# Patient Record
Sex: FEMALE | Race: WHITE | NOT HISPANIC OR LATINO | Employment: FULL TIME | URBAN - METROPOLITAN AREA
[De-identification: names, ages, dates, MRNs, and addresses within clinical notes are randomized per-mention and may not be internally consistent; named-entity substitution may affect disease eponyms.]

---

## 2023-08-09 LAB
ALANINE AMINOTRANSFERASE (SGPT) (U/L) IN SER/PLAS: 14 U/L (ref 7–45)
ALBUMIN (G/DL) IN SER/PLAS: 4.6 G/DL (ref 3.4–5)
ALKALINE PHOSPHATASE (U/L) IN SER/PLAS: 77 U/L (ref 33–110)
ANION GAP IN SER/PLAS: 12 MMOL/L (ref 10–20)
ASPARTATE AMINOTRANSFERASE (SGOT) (U/L) IN SER/PLAS: 19 U/L (ref 9–39)
BASOPHILS (10*3/UL) IN BLOOD BY AUTOMATED COUNT: 0.04 X10E9/L (ref 0–0.1)
BASOPHILS/100 LEUKOCYTES IN BLOOD BY AUTOMATED COUNT: 0.7 % (ref 0–2)
BILIRUBIN TOTAL (MG/DL) IN SER/PLAS: 0.7 MG/DL (ref 0–1.2)
C REACTIVE PROTEIN (MG/L) IN SER/PLAS: 0.31 MG/DL
CALCIUM (MG/DL) IN SER/PLAS: 10.3 MG/DL (ref 8.6–10.6)
CARBON DIOXIDE, TOTAL (MMOL/L) IN SER/PLAS: 32 MMOL/L (ref 21–32)
CHLORIDE (MMOL/L) IN SER/PLAS: 100 MMOL/L (ref 98–107)
CORTISOL (UG/DL) IN SERUM: 17.7 UG/DL (ref 2.5–20)
CREATININE (MG/DL) IN SER/PLAS: 0.77 MG/DL (ref 0.5–1.05)
EOSINOPHILS (10*3/UL) IN BLOOD BY AUTOMATED COUNT: 0.18 X10E9/L (ref 0–0.7)
EOSINOPHILS/100 LEUKOCYTES IN BLOOD BY AUTOMATED COUNT: 3.2 % (ref 0–6)
ERYTHROCYTE DISTRIBUTION WIDTH (RATIO) BY AUTOMATED COUNT: 12.7 % (ref 11.5–14.5)
ERYTHROCYTE MEAN CORPUSCULAR HEMOGLOBIN CONCENTRATION (G/DL) BY AUTOMATED: 32.6 G/DL (ref 32–36)
ERYTHROCYTE MEAN CORPUSCULAR VOLUME (FL) BY AUTOMATED COUNT: 98 FL (ref 80–100)
ERYTHROCYTES (10*6/UL) IN BLOOD BY AUTOMATED COUNT: 4.43 X10E12/L (ref 4–5.2)
FOLLITROPIN (IU/L) IN SER/PLAS: 37.4 IU/L
GFR FEMALE: >90 ML/MIN/1.73M2
GLUCOSE (MG/DL) IN SER/PLAS: 83 MG/DL (ref 74–99)
HEMATOCRIT (%) IN BLOOD BY AUTOMATED COUNT: 43.3 % (ref 36–46)
HEMOGLOBIN (G/DL) IN BLOOD: 14.1 G/DL (ref 12–16)
IMMATURE GRANULOCYTES/100 LEUKOCYTES IN BLOOD BY AUTOMATED COUNT: 0.2 % (ref 0–0.9)
LEUKOCYTES (10*3/UL) IN BLOOD BY AUTOMATED COUNT: 5.6 X10E9/L (ref 4.4–11.3)
LUTEINIZING HORMONE (IU/ML) IN SER/PLAS: 44 IU/L
LYMPHOCYTES (10*3/UL) IN BLOOD BY AUTOMATED COUNT: 2.12 X10E9/L (ref 1.2–4.8)
LYMPHOCYTES/100 LEUKOCYTES IN BLOOD BY AUTOMATED COUNT: 37.9 % (ref 13–44)
MONOCYTES (10*3/UL) IN BLOOD BY AUTOMATED COUNT: 0.52 X10E9/L (ref 0.1–1)
MONOCYTES/100 LEUKOCYTES IN BLOOD BY AUTOMATED COUNT: 9.3 % (ref 2–10)
NEUTROPHILS (10*3/UL) IN BLOOD BY AUTOMATED COUNT: 2.73 X10E9/L (ref 1.2–7.7)
NEUTROPHILS/100 LEUKOCYTES IN BLOOD BY AUTOMATED COUNT: 48.7 % (ref 40–80)
NRBC (PER 100 WBCS) BY AUTOMATED COUNT: 0 /100 WBC (ref 0–0)
PLATELETS (10*3/UL) IN BLOOD AUTOMATED COUNT: 330 X10E9/L (ref 150–450)
POTASSIUM (MMOL/L) IN SER/PLAS: 4.6 MMOL/L (ref 3.5–5.3)
PROTEIN TOTAL: 7.4 G/DL (ref 6.4–8.2)
SEDIMENTATION RATE, ERYTHROCYTE: 6 MM/H (ref 0–30)
SODIUM (MMOL/L) IN SER/PLAS: 139 MMOL/L (ref 136–145)
THYROTROPIN (MIU/L) IN SER/PLAS BY DETECTION LIMIT <= 0.05 MIU/L: 2.76 MIU/L (ref 0.44–3.98)
UREA NITROGEN (MG/DL) IN SER/PLAS: 19 MG/DL (ref 6–23)

## 2023-10-31 DIAGNOSIS — E78.5 HYPERLIPIDEMIA, UNSPECIFIED HYPERLIPIDEMIA TYPE: Primary | ICD-10-CM

## 2023-11-04 PROBLEM — K21.9 GASTROESOPHAGEAL REFLUX DISEASE: Status: ACTIVE | Noted: 2023-11-04

## 2023-11-04 PROBLEM — E55.9 VITAMIN D DEFICIENCY: Status: ACTIVE | Noted: 2023-11-04

## 2023-11-04 PROBLEM — M06.4 POLYARTHRITIS, INFLAMMATORY (MULTI): Status: ACTIVE | Noted: 2023-11-04

## 2023-11-04 PROBLEM — M70.60 GREATER TROCHANTERIC BURSITIS: Status: ACTIVE | Noted: 2023-11-04

## 2023-11-04 PROBLEM — R09.A2 GLOBUS PHARYNGEUS: Status: ACTIVE | Noted: 2023-11-04

## 2023-11-04 PROBLEM — G43.009 MIGRAINE WITHOUT AURA AND WITHOUT STATUS MIGRAINOSUS, NOT INTRACTABLE: Status: ACTIVE | Noted: 2023-11-04

## 2023-11-04 PROBLEM — R00.0 TACHYCARDIA: Status: ACTIVE | Noted: 2023-11-04

## 2023-11-04 PROBLEM — R93.1 ELEVATED CORONARY ARTERY CALCIUM SCORE: Status: ACTIVE | Noted: 2023-11-04

## 2023-11-04 PROBLEM — R05.3 CHRONIC COUGH: Status: ACTIVE | Noted: 2023-11-04

## 2023-11-04 PROBLEM — E78.00 HYPERCHOLESTEROLEMIA: Status: ACTIVE | Noted: 2023-11-04

## 2023-11-04 PROBLEM — R51.9 CHRONIC DAILY HEADACHE: Status: ACTIVE | Noted: 2023-11-04

## 2023-11-04 PROBLEM — G89.29: Status: ACTIVE | Noted: 2023-11-04

## 2023-11-04 PROBLEM — I73.00 RAYNAUDS PHENOMENON: Status: ACTIVE | Noted: 2023-11-04

## 2023-11-04 PROBLEM — R06.02 SOB (SHORTNESS OF BREATH) ON EXERTION: Status: ACTIVE | Noted: 2023-11-04

## 2023-11-04 RX ORDER — LORATADINE 10 MG/1
CAPSULE, LIQUID FILLED ORAL
COMMUNITY
Start: 2020-02-07

## 2023-11-04 RX ORDER — ALBUTEROL SULFATE 90 UG/1
AEROSOL, METERED RESPIRATORY (INHALATION)
COMMUNITY
Start: 2023-04-10

## 2023-11-04 RX ORDER — ACETAMINOPHEN 500 MG
TABLET ORAL
COMMUNITY
Start: 2020-02-07

## 2023-11-04 RX ORDER — DOXYCYCLINE HYCLATE 100 MG
100 TABLET ORAL 2 TIMES DAILY
COMMUNITY
Start: 2023-10-25 | End: 2023-11-04

## 2023-11-04 RX ORDER — CYCLOBENZAPRINE HCL 10 MG
1 TABLET ORAL NIGHTLY
COMMUNITY
Start: 2020-08-12

## 2023-11-04 RX ORDER — NORETHINDRONE ACETATE AND ETHINYL ESTRADIOL .02; 1 MG/1; MG/1
1 TABLET ORAL DAILY
COMMUNITY
Start: 2021-08-11

## 2023-11-04 RX ORDER — ASPIRIN 325 MG
TABLET, DELAYED RELEASE (ENTERIC COATED) ORAL
COMMUNITY
Start: 2022-09-09

## 2023-11-04 RX ORDER — KETOROLAC TROMETHAMINE 10 MG/1
TABLET, FILM COATED ORAL
COMMUNITY
Start: 2019-01-02 | End: 2023-12-29 | Stop reason: SDUPTHER

## 2023-11-04 RX ORDER — PITAVASTATIN CALCIUM 2.09 MG/1
1 TABLET, FILM COATED ORAL DAILY
COMMUNITY
Start: 2023-10-13 | End: 2023-11-10 | Stop reason: SDUPTHER

## 2023-11-04 RX ORDER — IBUPROFEN 600 MG/1
TABLET ORAL
COMMUNITY
Start: 2019-01-23

## 2023-11-04 RX ORDER — TRIAMCINOLONE ACETONIDE 1 MG/G
CREAM TOPICAL
COMMUNITY
Start: 2023-05-02

## 2023-11-09 PROBLEM — I25.10 CAD (CORONARY ARTERY DISEASE): Status: ACTIVE | Noted: 2023-11-09

## 2023-11-10 ENCOUNTER — OFFICE VISIT (OUTPATIENT)
Dept: CARDIOLOGY | Facility: CLINIC | Age: 53
End: 2023-11-10
Payer: COMMERCIAL

## 2023-11-10 VITALS
DIASTOLIC BLOOD PRESSURE: 78 MMHG | WEIGHT: 172.8 LBS | SYSTOLIC BLOOD PRESSURE: 122 MMHG | OXYGEN SATURATION: 100 % | BODY MASS INDEX: 27.12 KG/M2 | HEART RATE: 70 BPM | HEIGHT: 67 IN

## 2023-11-10 DIAGNOSIS — R93.1 ELEVATED CORONARY ARTERY CALCIUM SCORE: ICD-10-CM

## 2023-11-10 DIAGNOSIS — E78.00 HYPERCHOLESTEROLEMIA: Primary | ICD-10-CM

## 2023-11-10 DIAGNOSIS — R00.0 TACHYCARDIA: ICD-10-CM

## 2023-11-10 PROCEDURE — 99214 OFFICE O/P EST MOD 30 MIN: CPT | Performed by: INTERNAL MEDICINE

## 2023-11-10 RX ORDER — PITAVASTATIN CALCIUM 2.09 MG/1
1 TABLET, FILM COATED ORAL DAILY
Qty: 90 TABLET | Refills: 3 | Status: SHIPPED | OUTPATIENT
Start: 2023-11-10 | End: 2024-05-10 | Stop reason: SDUPTHER

## 2023-11-10 NOTE — PROGRESS NOTES
"Chief Complaint:   No chief complaint on file.     History Of Present Illness:    Nelly Phelps is a 53 y.o. female presenting for a 6 month visit   She has a h/o elevated CAC score, DL, tachycardia post Covid, chest pressure, chronic daily HA / migraine, polyarthritis, Raynaud's phenom malignant melanoma, Covid-19 in 2021.    Nelly has been doing well since prior visit.  She continues to run regularly and has been working with a  to help with optimal fueling.  She does still report episodes of fast heart rates that occur later into a longer run, but tells me during shorter runs, this does not happen.  Tells me she is tolerating current dose statin.  No new CV symptoms.    ROS:  The remainder of the review of systems was obtained, as was negative as pertains to the chief complaint.      Labs 2023:    TG 88  LDL 99  HDL 86  Tchol/HDL ratio 2.36    Stress 10/21: normal stress echo, LVEF 55-60% (13.7 METS, 100% MPHR)     FHx:  her parental grandmother had carotid artery dse and  at age 61 of an MI. Her father  from suicide. Her mom had no heart issues.       Prior hx:  The patient was seen by Dr. Smiht in cardiology due to elevated HR. She had Covid in , and since infection noticed palpitations. She noticed her HR was going up to 170's, seemed to be higher than usual, not coming down as quickly like it used to. Prior to this, was runing 10-10.5 minute mile, with HR's sitting in the 150-165 range, \"easy run.\" Could run 10 miles and HR's stay 150-165. After a couple miles would creep up to 170's, and would take longer to come down.   In , she did mileage challenges such as 5K every day, or biking/running - felt a little burned out. Took a month off of this kind of training.      After seeing the cardiologist, testing included a stress echo in 10/21 which was negative for ischemia, and a CAC score in 10/21, total score of 13.29, putting her in a low risk group for having CV events. " She was recommended a plant-based diet, and was also recommended to start atorvastatin, which she has not taken due to concerns that she already has significant aches and pains from polyarthritis.      She has had a headache for 10 years, ? neck tension. Started on the pill in 5/2021 due to frequent periods, to help regularize her.     Last Recorded Vitals:  There were no vitals filed for this visit.    Past Medical History:  She has a past medical history of COVID-19 (10/18/2021), Encounter for screening for malignant neoplasm of colon (04/28/2021), Headache, unspecified (02/07/2020), Inflammatory polyarthropathy (CMS/HCC) (08/10/2022), Personal history of malignant melanoma of skin, Personal history of other diseases of the musculoskeletal system and connective tissue (04/13/2018), Personal history of other diseases of the musculoskeletal system and connective tissue (08/09/2019), Personal history of other endocrine, nutritional and metabolic disease, and Raynaud's syndrome without gangrene.    Past Surgical History:  She has a past surgical history that includes Other surgical history (12/26/2017); Knee surgery (12/26/2017); Other surgical history (10/18/2021); and Nasal septum surgery (01/03/2018).      Social History:  She has no history on file for tobacco use, alcohol use, and drug use.    Family History:  Family History   Problem Relation Name Age of Onset    Thyroid nodules Mother      Other (etoh dependence) Father      Other (suicide) Father      Hypertension Other grandparent     Coronary artery disease Other grandmother     Diabetes Other grandmother         Allergies:  Amoxicillin    Outpatient Medications:  Current Outpatient Medications   Medication Instructions    albuterol 90 mcg/actuation inhaler     cholecalciferol (Vitamin D-3) 5,000 Units tablet oral    cyclobenzaprine (Flexeril) 10 mg tablet 1 tablet, oral, Nightly    ferrous sulfate (IRON ORAL) oral    ibuprofen 600 mg tablet oral     ketorolac (Toradol) 10 mg tablet oral    Livalo 2 mg tablet 1 tablet, oral, Daily    loratadine (Claritin Liqui-Gel) 10 mg capsule oral    MAGNESIUM GLYCINATE ORAL 2 capsules, oral, Daily    norethindrone ac-eth estradioL (Loestrin 1/20, 21,) 1-20 mg-mcg tablet 1 tablet, oral, Daily    omega-3 (Fish OiL) 60- mg capsule oral    triamcinolone (Kenalog) 0.1 % cream APPLY TOPICALLY TWICE PER DAY FOR 14 DAYS       Physical Exam:  Physical Exam  HENT:      Head: Normocephalic.      Nose: Nose normal.      Mouth/Throat:      Mouth: Mucous membranes are moist.   Eyes:      Pupils: Pupils are equal, round, and reactive to light.   Cardiovascular:      Rate and Rhythm: Normal rate and regular rhythm.      Comments: No carotid bruits   No leg swelling noted  Pulmonary:      Effort: Pulmonary effort is normal.      Breath sounds: Normal breath sounds.   Abdominal:      Palpations: Abdomen is soft.   Musculoskeletal:         General: Normal range of motion.      Cervical back: Normal range of motion.   Skin:     General: Skin is warm and dry.   Neurological:      General: No focal deficit present.      Mental Status: She is alert.   Psychiatric:         Mood and Affect: Mood normal.          Last Labs:  CBC -  Lab Results   Component Value Date    WBC 5.6 08/09/2023    HGB 14.1 08/09/2023    HCT 43.3 08/09/2023    MCV 98 08/09/2023     08/09/2023       CMP -  Lab Results   Component Value Date    CALCIUM 10.3 08/09/2023    PROT 7.4 08/09/2023    ALBUMIN 4.6 08/09/2023    AST 19 08/09/2023    ALT 14 08/09/2023    ALKPHOS 77 08/09/2023    BILITOT 0.7 08/09/2023       LIPID PANEL -   Lab Results   Component Value Date    CHOL 225 (H) 08/10/2022    TRIG 160 (H) 08/10/2022    HDL 71.4 08/10/2022    CHHDL 3.2 08/10/2022    LDLF 122 (H) 08/10/2022    VLDL 32 08/10/2022       RENAL FUNCTION PANEL -   Lab Results   Component Value Date    GLUCOSE 83 08/09/2023     08/09/2023    K 4.6 08/09/2023     08/09/2023     CO2 32 08/09/2023    ANIONGAP 12 08/09/2023    BUN 19 08/09/2023    CREATININE 0.77 08/09/2023    CALCIUM 10.3 08/09/2023    ALBUMIN 4.6 08/09/2023        Lab Results   Component Value Date    HGBA1C 5.4 09/09/2022       Assessment/Plan   Problem List Items Addressed This Visit             ICD-10-CM       Cardiac and Vasculature    Elevated coronary artery calcium score R93.1     Total score 13.29.  No current chest pain.  Normal stress echo 10/2021.  -continue current dose statin  -continue healthy cardiac lifestyle modifications         Relevant Medications    Livalo 2 mg tablet    Other Relevant Orders    Lipid panel    Follow Up In Cardiology    Hypercholesterolemia - Primary E78.00     Cholesterol has improved with lifestyle modifications and livalo.  She would like to continue ongoing lfiestyle modifications at tthis point.  Needs repeat FLP  -livalo 2mg daily  -rpt FLP ordered         Relevant Medications    Livalo 2 mg tablet    Other Relevant Orders    Lipid panel    Follow Up In Cardiology    Tachycardia R00.0

## 2023-11-10 NOTE — PATIENT INSTRUCTIONS
Thanks for following up in office today.    1)  I have ordered your fasting blood cholesterol for you to get prior to our visit in 6 months.  Please fast at least 8 hours prior to having this blood drawn    2)  Please continue your cardiac medications as prescribed.  I did refill you livalo, you have a year's worth of refills.    Follow up with me in 6 months  If you have any questions, please call [phone] and choose option for Dr. Carpenter's nurse Chelsy Bell

## 2023-11-27 NOTE — ASSESSMENT & PLAN NOTE
Cholesterol has improved with lifestyle modifications and livalo.  She would like to continue ongoing lfiestyle modifications at tthis point.  Needs repeat FLP  -livalo 2mg daily  -rpt FLP ordered

## 2023-11-27 NOTE — ASSESSMENT & PLAN NOTE
Total score 13.29.  No current chest pain.  Normal stress echo 10/2021.  -continue current dose statin  -continue healthy cardiac lifestyle modifications

## 2023-12-29 ENCOUNTER — TELEPHONE (OUTPATIENT)
Dept: RHEUMATOLOGY | Facility: CLINIC | Age: 53
End: 2023-12-29
Payer: COMMERCIAL

## 2023-12-29 DIAGNOSIS — G43.009 MIGRAINE WITHOUT AURA AND WITHOUT STATUS MIGRAINOSUS, NOT INTRACTABLE: Primary | ICD-10-CM

## 2023-12-29 RX ORDER — KETOROLAC TROMETHAMINE 10 MG/1
10 TABLET, FILM COATED ORAL EVERY 6 HOURS PRN
Qty: 15 TABLET | Refills: 1 | Status: SHIPPED | OUTPATIENT
Start: 2023-12-29 | End: 2024-01-03

## 2023-12-30 NOTE — TELEPHONE ENCOUNTER
On call note  Pt with migraine    Out of ketorolac  Unable to reach other MD's that manage  Called in

## 2024-05-10 ENCOUNTER — OFFICE VISIT (OUTPATIENT)
Dept: CARDIOLOGY | Facility: CLINIC | Age: 54
End: 2024-05-10
Payer: COMMERCIAL

## 2024-05-10 ENCOUNTER — APPOINTMENT (OUTPATIENT)
Dept: CARDIOLOGY | Facility: CLINIC | Age: 54
End: 2024-05-10
Payer: COMMERCIAL

## 2024-05-10 VITALS
HEART RATE: 64 BPM | WEIGHT: 174.6 LBS | BODY MASS INDEX: 27.4 KG/M2 | SYSTOLIC BLOOD PRESSURE: 122 MMHG | HEIGHT: 67 IN | DIASTOLIC BLOOD PRESSURE: 66 MMHG

## 2024-05-10 DIAGNOSIS — R93.1 ELEVATED CORONARY ARTERY CALCIUM SCORE: Primary | ICD-10-CM

## 2024-05-10 DIAGNOSIS — E78.00 HYPERCHOLESTEROLEMIA: ICD-10-CM

## 2024-05-10 PROCEDURE — 1036F TOBACCO NON-USER: CPT | Performed by: INTERNAL MEDICINE

## 2024-05-10 PROCEDURE — 93000 ELECTROCARDIOGRAM COMPLETE: CPT | Performed by: INTERNAL MEDICINE

## 2024-05-10 PROCEDURE — 99214 OFFICE O/P EST MOD 30 MIN: CPT | Performed by: INTERNAL MEDICINE

## 2024-05-10 RX ORDER — TRIAMCINOLONE ACETONIDE 55 UG/1
2 SPRAY, METERED NASAL DAILY
COMMUNITY

## 2024-05-10 RX ORDER — PITAVASTATIN CALCIUM 2.09 MG/1
1 TABLET, FILM COATED ORAL DAILY
Qty: 90 TABLET | Refills: 3 | Status: SHIPPED | OUTPATIENT
Start: 2024-05-10 | End: 2024-05-10

## 2024-05-10 RX ORDER — FEXOFENADINE HCL 60 MG
60 TABLET ORAL DAILY
COMMUNITY

## 2024-05-10 RX ORDER — PITAVASTATIN CALCIUM 2.09 MG/1
1 TABLET, FILM COATED ORAL DAILY
Qty: 90 TABLET | Refills: 3 | Status: SHIPPED | OUTPATIENT
Start: 2024-05-10 | End: 2025-05-10

## 2024-05-10 NOTE — PATIENT INSTRUCTIONS
Thanks for following up in office today.    1)  Your blood pressure is good today.      2)  We reviewed your cholesterol, CK blood work and I am very happy with these numbers.    3)  Please continue your cardiac medications as prescribed.  I am sending in refills of your medications. Call me if you have anymore of those symptoms.    Follow up with me in a year.  If you have any questions, please call [phone] and choose option for Dr. Carpenter's nurse Chelsy Bell

## 2024-05-16 ENCOUNTER — TELEPHONE (OUTPATIENT)
Dept: CARDIOLOGY | Facility: CLINIC | Age: 54
End: 2024-05-16
Payer: COMMERCIAL

## 2024-05-16 NOTE — TELEPHONE ENCOUNTER
5/16 - Patient called to request a 2 week supply of Livalo be called into [facility]. At her most recent visit, a 90 day supply was ordered through [facility], which subsequently canceled the local pharmacy order in our system. Patient will be going out of town in a few days, and the [facility] order will arrive while she is gone. Patient stated she is out of pills and requested that we call in a 2 week supply to [facility] to tide her over. Chelsy FRYE verbally called in to local pharmacy. Patient informed. - Adán WASHINGTON

## 2024-07-26 ENCOUNTER — TELEPHONE (OUTPATIENT)
Dept: RHEUMATOLOGY | Facility: CLINIC | Age: 54
End: 2024-07-26
Payer: COMMERCIAL

## 2024-07-26 DIAGNOSIS — M06.4 POLYARTHRITIS, INFLAMMATORY (MULTI): Primary | ICD-10-CM

## 2024-07-26 RX ORDER — CYCLOBENZAPRINE HCL 10 MG
10 TABLET ORAL NIGHTLY
Qty: 30 TABLET | Refills: 11 | Status: SHIPPED | OUTPATIENT
Start: 2024-07-26 | End: 2025-07-26

## 2024-08-11 RX ORDER — BUDESONIDE 180 UG/1
1 AEROSOL, POWDER RESPIRATORY (INHALATION)
COMMUNITY
Start: 2024-07-06

## 2024-08-12 ENCOUNTER — APPOINTMENT (OUTPATIENT)
Dept: RHEUMATOLOGY | Facility: CLINIC | Age: 54
End: 2024-08-12
Payer: COMMERCIAL

## 2024-08-12 VITALS
SYSTOLIC BLOOD PRESSURE: 130 MMHG | HEIGHT: 67 IN | WEIGHT: 177.9 LBS | TEMPERATURE: 97.3 F | BODY MASS INDEX: 27.92 KG/M2 | HEART RATE: 72 BPM | DIASTOLIC BLOOD PRESSURE: 82 MMHG

## 2024-08-12 DIAGNOSIS — J45.20 MILD INTERMITTENT ASTHMA WITHOUT COMPLICATION (HHS-HCC): ICD-10-CM

## 2024-08-12 DIAGNOSIS — M06.4 POLYARTHRITIS, INFLAMMATORY (MULTI): Primary | ICD-10-CM

## 2024-08-12 DIAGNOSIS — I73.00 RAYNAUD'S PHENOMENON WITHOUT GANGRENE: ICD-10-CM

## 2024-08-12 LAB
NON-UH HIE A/G RATIO: 1.2
NON-UH HIE ALB: 4 G/DL (ref 3.4–5)
NON-UH HIE ALK PHOS: 83 UNIT/L (ref 45–117)
NON-UH HIE BASO COUNT: 0.04 X1000 (ref 0–0.2)
NON-UH HIE BASOS %: 0.9 %
NON-UH HIE BILIRUBIN, TOTAL: 0.5 MG/DL (ref 0.3–1.2)
NON-UH HIE BUN/CREAT RATIO: 22.5
NON-UH HIE BUN: 18 MG/DL (ref 9–23)
NON-UH HIE C-REACTIVE PROTEIN, QUANTITATIVE: <0.4 MG/DL (ref 0–0.9)
NON-UH HIE CALCIUM: 10 MG/DL (ref 8.7–10.4)
NON-UH HIE CALCULATED OSMOLALITY: 285 MOSM/KG (ref 275–295)
NON-UH HIE CHLORIDE: 106 MMOL/L (ref 98–107)
NON-UH HIE CO2, VENOUS: 30 MMOL/L (ref 20–31)
NON-UH HIE CREATININE: 0.8 MG/DL (ref 0.5–0.8)
NON-UH HIE DIFF?: NO
NON-UH HIE EOS COUNT: 0.12 X1000 (ref 0–0.5)
NON-UH HIE EOSIN %: 2.4 %
NON-UH HIE FERRITIN: 86 NG/ML (ref 10–291)
NON-UH HIE GFR AA: >60
NON-UH HIE GLOBULIN: 3.2 G/DL
NON-UH HIE GLOMERULAR FILTRATION RATE: >60 ML/MIN/1.73M?
NON-UH HIE GLUCOSE: 94 MG/DL (ref 74–106)
NON-UH HIE GOT: 19 UNIT/L (ref 15–37)
NON-UH HIE GPT: 16 UNIT/L (ref 10–49)
NON-UH HIE HCT: 41 % (ref 36–46)
NON-UH HIE HGB: 13.8 G/DL (ref 12–16)
NON-UH HIE INSTR WBC: 5
NON-UH HIE IRON: 131 UG/DL (ref 50–170)
NON-UH HIE K: 4.4 MMOL/L (ref 3.5–5.1)
NON-UH HIE LYMPH %: 31.9 %
NON-UH HIE LYMPH COUNT: 1.59 X1000 (ref 1.2–4.8)
NON-UH HIE MCH: 32 PG (ref 27–34)
NON-UH HIE MCHC: 33.7 G/DL (ref 32–37)
NON-UH HIE MCV: 95 FL (ref 80–100)
NON-UH HIE MONO %: 9.3 %
NON-UH HIE MONO COUNT: 0.46 X1000 (ref 0.1–1)
NON-UH HIE MPV: 8 FL (ref 7.4–10.4)
NON-UH HIE NA: 142 MMOL/L (ref 135–145)
NON-UH HIE NEUTROPHIL %: 55.6 %
NON-UH HIE NEUTROPHIL COUNT (ANC): 2.76 X1000 (ref 1.4–8.8)
NON-UH HIE NUCLEATED RBC: 0 /100WBC
NON-UH HIE PLATELET: 304 X10 (ref 150–450)
NON-UH HIE RBC: 4.32 X10 (ref 4.2–5.4)
NON-UH HIE RDW: 13.2 % (ref 11.5–14.5)
NON-UH HIE SATURATION: 44.6 % (ref 20–50)
NON-UH HIE SED RATE WESTERGREN: 12 MM/HR (ref 0–30)
NON-UH HIE TIBC: 294 UG/ML (ref 250–425)
NON-UH HIE TOTAL PROTEIN: 7.2 G/DL (ref 5.7–8.2)
NON-UH HIE WBC: 5 X10 (ref 4.5–11)

## 2024-08-12 PROCEDURE — 3008F BODY MASS INDEX DOCD: CPT | Performed by: INTERNAL MEDICINE

## 2024-08-12 PROCEDURE — 1036F TOBACCO NON-USER: CPT | Performed by: INTERNAL MEDICINE

## 2024-08-12 PROCEDURE — 99214 OFFICE O/P EST MOD 30 MIN: CPT | Performed by: INTERNAL MEDICINE

## 2024-08-12 ASSESSMENT — ENCOUNTER SYMPTOMS
FATIGUE: 0
JOINT SWELLING: 0
COUGH: 1
WEAKNESS: 0
SHORTNESS OF BREATH: 0
ARTHRALGIAS: 1
MYALGIAS: 0
BACK PAIN: 0
COLOR CHANGE: 0
NUMBNESS: 0
FEVER: 0

## 2024-08-12 ASSESSMENT — PATIENT HEALTH QUESTIONNAIRE - PHQ9
2. FEELING DOWN, DEPRESSED OR HOPELESS: NOT AT ALL
SUM OF ALL RESPONSES TO PHQ9 QUESTIONS 1 AND 2: 0
1. LITTLE INTEREST OR PLEASURE IN DOING THINGS: NOT AT ALL

## 2024-08-12 NOTE — PATIENT INSTRUCTIONS
It was a pleasure to see you today  Please call if your symptoms worsen  Please review your summary for education and reminders.  Follow up at your next appointment.    If you had labs/xrays done today, you will be able to view on Entitle.   We will contact you when the results are reviewed for further discussion.  Please note that you may receive your results before I have had a chance to review.  Please know I will be contacting you for discussion  Homegoing instructions for all patient  A healthy lifestyle helps chronic diseases  These are all the goals you should strive to improve your overall health   Blood pressure <130/85   BMI of <30 or waist circumference that is 1/2 of your height   Fasting blood sugar <107 (if you are diabetic, aim for an A1c <6.4%_   LDL cholesterol <130   Avoid smoking   Manage your stress   Get your preventive exams   Get your immunizations

## 2024-08-12 NOTE — PROGRESS NOTES
[facility] RHEUMATOLOGY     AND INTERNAL MEDICINE    RHEUMATOLOGY PROGRESS NOTE  Nelly Phelps 54 y.o. female  Chief Complaint   Patient presents with    polyarthritis       SUBJECTIVE  Overall feels good.  Since last seen, investigated her chronic cough and found to have asthma, even though she never wheezes (she had a positive provocative test to diagnose).   Pt notes she is now running and not having a lot of post run headaches and other symptoms.   She did run a 40 mile race this summer      Records since last seen reviewed in [facility], [facility] and [facility] Record  Patient Active Problem List    Diagnosis Date Noted    CAD (coronary artery disease) 11/09/2023    Elevated coronary artery calcium score 11/04/2023    Mild intermittent asthma without complication ([facility]) 11/04/2023    Chronic daily headache 11/04/2023    Chronic primary generalized pain 11/04/2023    Gastroesophageal reflux disease 11/04/2023    Globus pharyngeus 11/04/2023    Greater trochanteric bursitis 11/04/2023    Hypercholesterolemia 11/04/2023    Migraine without aura and without status migrainosus, not intractable 11/04/2023    Polyarthritis, inflammatory (Multi) 11/04/2023    Raynauds phenomenon 11/04/2023    SOB (shortness of breath) on exertion 11/04/2023    Tachycardia 11/04/2023    Vitamin D deficiency 11/04/2023     Past Medical History:   Diagnosis Date    Consumes one serving of caffeine per day     COVID-19 10/18/2021    COVID-19    Headache, unspecified 02/07/2020    Chronic daily headache    Personal history of malignant melanoma of skin     History of malignant melanoma    Raynaud's syndrome without gangrene     Raynaud's phenomenon    Vitamin D deficiency     History of vitamin D deficiency     Current Outpatient Medications on File Prior to Visit   Medication Sig Dispense Refill    albuterol 90 mcg/actuation inhaler       cholecalciferol (Vitamin D-3) 5,000  "Units tablet Take by mouth.      cyclobenzaprine (Flexeril) 10 mg tablet Take 1 tablet (10 mg) by mouth once daily at bedtime. 30 tablet 11    ferrous sulfate (IRON ORAL) Take by mouth.      ibuprofen 600 mg tablet Take by mouth.      Livalo 2 mg tablet Take 1 tablet by mouth once daily. 90 tablet 3    MAGNESIUM GLYCINATE ORAL Take 2 capsules by mouth once daily.      omega-3 (Fish OiL) 60- mg capsule Take by mouth.      Pulmicort Flexhaler 180 mcg/actuation inhaler Inhale 1 puff 2 times a day.      triamcinolone (Nasacort) 55 mcg nasal inhaler Administer 2 sprays into each nostril once daily.      [DISCONTINUED] fexofenadine (Allegra) 60 mg tablet Take 1 tablet (60 mg) by mouth once daily.      [DISCONTINUED] loratadine (Claritin Liqui-Gel) 10 mg capsule Take by mouth.      [DISCONTINUED] norethindrone ac-eth estradioL (Loestrin 1/20, 21,) 1-20 mg-mcg tablet Take 1 tablet by mouth once daily.      [DISCONTINUED] triamcinolone (Kenalog) 0.1 % cream APPLY TOPICALLY TWICE PER DAY FOR 14 DAYS       No current facility-administered medications on file prior to visit.     Allergies   Allergen Reactions    Amoxicillin Rash     Social History     Tobacco Use    Smoking status: Never    Smokeless tobacco: Never   Substance Use Topics    Alcohol use: Yes     Comment: 1-2 drinks per month maximum    Drug use: Never     Review of Systems   Constitutional:  Negative for fatigue and fever.   Respiratory:  Positive for cough. Negative for shortness of breath.    Cardiovascular:  Negative for leg swelling.   Musculoskeletal:  Positive for arthralgias. Negative for back pain, gait problem, joint swelling and myalgias.   Skin:  Negative for color change and rash.   Neurological:  Negative for weakness and numbness.       PHYSICAL EXAM  /82   Pulse 72   Temp 36.3 °C (97.3 °F)   Ht 1.702 m (5' 7\")   Wt 80.7 kg (177 lb 14.4 oz)   PF 99 L/min   BMI 27.86 kg/m²   Depression: Not at risk (8/12/2024)    PHQ-2     PHQ-2 " Score: 0     Physical Exam  Vitals reviewed.   Constitutional:       General: She is not in acute distress.     Appearance: Normal appearance.   HENT:      Head: Normocephalic and atraumatic.   Eyes:      General: No scleral icterus.     Extraocular Movements: Extraocular movements intact.      Conjunctiva/sclera: Conjunctivae normal.      Pupils: Pupils are equal, round, and reactive to light.   Pulmonary:      Effort: Pulmonary effort is normal. No respiratory distress.   Musculoskeletal:         General: No swelling, tenderness or deformity.      Cervical back: Normal range of motion and neck supple. No tenderness.      Right lower leg: No edema.      Left lower leg: No edema.      Comments: No synovitis of examined joints   Skin:     Coloration: Skin is not pale.      Findings: No erythema or rash.   Neurological:      General: No focal deficit present.      Mental Status: She is alert and oriented to person, place, and time. Mental status is at baseline.      Gait: Gait normal.   Psychiatric:         Mood and Affect: Mood normal.       Health Maintenance Due   Topic Date Due    Yearly Adult Physical  Never done    HIV Screening  Never done    Hepatitis C Screening  Never done    Hepatitis B Vaccines (1 of 3 - 19+ 3-dose series) Never done    Cervical Cancer Screening  Never done    Mammogram  06/22/2022    Zoster Vaccines (3 of 3) 12/03/2022    Influenza Vaccine (1) 09/01/2024       Assessment/plan  Problem List Items Addressed This Visit       Mild intermittent asthma without complication (HHS-HCC)    Polyarthritis, inflammatory (Multi) - Primary    Current Assessment & Plan     Will reassess for a defined inflammatory disease.  Currently without symptoms.   Labs ordered         Relevant Orders    SANTIAGO with Reflex to MARIAH    CBC and Auto Differential    Comprehensive Metabolic Panel    C-Reactive Protein    Sedimentation Rate    Rheumatoid Factor    Citrulline Antibody, IgG    Ferritin    Iron and TIBC     Raynauds phenomenon       Follow up: ___12__months

## 2024-08-12 NOTE — LETTER
August 12, 2024     Kelli Stallings DO  [address] [facility] [address]    Patient: Nelly Phelps   YOB: 1970   Date of Visit: 8/12/2024       Dear Dr. Kelli Stallings DO:    Thank you for referring Nelly Phelps to me for evaluation. Below are my notes for this visit.  If you have questions, please do not hesitate to call me. I look forward to following your patient along with you.       Sincerely,     Kimberlyn Kc MD      CC: No Recipients  ______________________________________________________________________________________                                                    [facility] RHEUMATOLOGY     AND INTERNAL MEDICINE    RHEUMATOLOGY PROGRESS NOTE  Nelly Phelps 54 y.o. female  Chief Complaint   Patient presents with   • polyarthritis       SUBJECTIVE  Overall feels good.  Since last seen, investigated her chronic cough and found to have asthma, even though she never wheezes (she had a positive provocative test to diagnose).   Pt notes she is now running and not having a lot of post run headaches and other symptoms.   She did run a 40 mile race this summer      Records since last seen reviewed in [facility], [facility] and [facility] Record  Patient Active Problem List    Diagnosis Date Noted   • CAD (coronary artery disease) 11/09/2023   • Elevated coronary artery calcium score 11/04/2023   • Mild intermittent asthma without complication ([facility]) 11/04/2023   • Chronic daily headache 11/04/2023   • Chronic primary generalized pain 11/04/2023   • Gastroesophageal reflux disease 11/04/2023   • Globus pharyngeus 11/04/2023   • Greater trochanteric bursitis 11/04/2023   • Hypercholesterolemia 11/04/2023   • Migraine without aura and without status migrainosus, not intractable 11/04/2023   • Polyarthritis, inflammatory (Multi) 11/04/2023   • Raynauds phenomenon 11/04/2023   • SOB (shortness of breath) on exertion 11/04/2023   • Tachycardia 11/04/2023   •  Vitamin D deficiency 11/04/2023     Past Medical History:   Diagnosis Date   • Consumes one serving of caffeine per day    • COVID-19 10/18/2021    COVID-19   • Headache, unspecified 02/07/2020    Chronic daily headache   • Personal history of malignant melanoma of skin     History of malignant melanoma   • Raynaud's syndrome without gangrene     Raynaud's phenomenon   • Vitamin D deficiency     History of vitamin D deficiency     Current Outpatient Medications on File Prior to Visit   Medication Sig Dispense Refill   • albuterol 90 mcg/actuation inhaler      • cholecalciferol (Vitamin D-3) 5,000 Units tablet Take by mouth.     • cyclobenzaprine (Flexeril) 10 mg tablet Take 1 tablet (10 mg) by mouth once daily at bedtime. 30 tablet 11   • ferrous sulfate (IRON ORAL) Take by mouth.     • ibuprofen 600 mg tablet Take by mouth.     • Livalo 2 mg tablet Take 1 tablet by mouth once daily. 90 tablet 3   • MAGNESIUM GLYCINATE ORAL Take 2 capsules by mouth once daily.     • omega-3 (Fish OiL) 60- mg capsule Take by mouth.     • Pulmicort Flexhaler 180 mcg/actuation inhaler Inhale 1 puff 2 times a day.     • triamcinolone (Nasacort) 55 mcg nasal inhaler Administer 2 sprays into each nostril once daily.     • [DISCONTINUED] fexofenadine (Allegra) 60 mg tablet Take 1 tablet (60 mg) by mouth once daily.     • [DISCONTINUED] loratadine (Claritin Liqui-Gel) 10 mg capsule Take by mouth.     • [DISCONTINUED] norethindrone ac-eth estradioL (Loestrin 1/20, 21,) 1-20 mg-mcg tablet Take 1 tablet by mouth once daily.     • [DISCONTINUED] triamcinolone (Kenalog) 0.1 % cream APPLY TOPICALLY TWICE PER DAY FOR 14 DAYS       No current facility-administered medications on file prior to visit.     Allergies   Allergen Reactions   • Amoxicillin Rash     Social History     Tobacco Use   • Smoking status: Never   • Smokeless tobacco: Never   Substance Use Topics   • Alcohol use: Yes     Comment: 1-2 drinks per month maximum   • Drug use:  "Never     Review of Systems   Constitutional:  Negative for fatigue and fever.   Respiratory:  Positive for cough. Negative for shortness of breath.    Cardiovascular:  Negative for leg swelling.   Musculoskeletal:  Positive for arthralgias. Negative for back pain, gait problem, joint swelling and myalgias.   Skin:  Negative for color change and rash.   Neurological:  Negative for weakness and numbness.       PHYSICAL EXAM  /82   Pulse 72   Temp 36.3 °C (97.3 °F)   Ht 1.702 m (5' 7\")   Wt 80.7 kg (177 lb 14.4 oz)   PF 99 L/min   BMI 27.86 kg/m²   Depression: Not at risk (8/12/2024)    PHQ-2    • PHQ-2 Score: 0     Physical Exam  Vitals reviewed.   Constitutional:       General: She is not in acute distress.     Appearance: Normal appearance.   HENT:      Head: Normocephalic and atraumatic.   Eyes:      General: No scleral icterus.     Extraocular Movements: Extraocular movements intact.      Conjunctiva/sclera: Conjunctivae normal.      Pupils: Pupils are equal, round, and reactive to light.   Pulmonary:      Effort: Pulmonary effort is normal. No respiratory distress.   Musculoskeletal:         General: No swelling, tenderness or deformity.      Cervical back: Normal range of motion and neck supple. No tenderness.      Right lower leg: No edema.      Left lower leg: No edema.      Comments: No synovitis of examined joints   Skin:     Coloration: Skin is not pale.      Findings: No erythema or rash.   Neurological:      General: No focal deficit present.      Mental Status: She is alert and oriented to person, place, and time. Mental status is at baseline.      Gait: Gait normal.   Psychiatric:         Mood and Affect: Mood normal.       Health Maintenance Due   Topic Date Due   • Yearly Adult Physical  Never done   • HIV Screening  Never done   • Hepatitis C Screening  Never done   • Hepatitis B Vaccines (1 of 3 - 19+ 3-dose series) Never done   • Cervical Cancer Screening  Never done   • Mammogram  " 06/22/2022   • Zoster Vaccines (3 of 3) 12/03/2022   • Influenza Vaccine (1) 09/01/2024       Assessment/plan  Problem List Items Addressed This Visit       Mild intermittent asthma without complication (HHS-HCC)    Polyarthritis, inflammatory (Multi) - Primary    Current Assessment & Plan     Will reassess for a defined inflammatory disease.  Currently without symptoms.   Labs ordered         Relevant Orders    SANTIAGO with Reflex to MARIAH    CBC and Auto Differential    Comprehensive Metabolic Panel    C-Reactive Protein    Sedimentation Rate    Rheumatoid Factor    Citrulline Antibody, IgG    Ferritin    Iron and TIBC    Raynauds phenomenon       Follow up: ___12__months

## 2024-08-13 LAB
NON-UH HIE ANTI-NUCLEAR ANTIBODY: NEGATIVE
NON-UH HIE RHEUMATOID FACTOR: NEGATIVE

## 2024-08-14 LAB — NON-UH HIE MISC SENDOUT: NORMAL

## 2024-09-12 DIAGNOSIS — M06.4 POLYARTHRITIS, INFLAMMATORY (MULTI): ICD-10-CM

## 2024-09-12 RX ORDER — CYCLOBENZAPRINE HCL 10 MG
10 TABLET ORAL NIGHTLY
Qty: 90 TABLET | Refills: 3 | Status: SHIPPED | OUTPATIENT
Start: 2024-09-12 | End: 2025-09-12

## 2024-09-12 RX ORDER — IBUPROFEN 600 MG/1
600 TABLET ORAL EVERY 6 HOURS PRN
Qty: 360 TABLET | Refills: 3 | Status: SHIPPED | OUTPATIENT
Start: 2024-09-12 | End: 2025-09-12

## 2024-11-04 ENCOUNTER — TELEPHONE (OUTPATIENT)
Dept: CARDIOLOGY | Facility: HOSPITAL | Age: 54
End: 2024-11-04
Payer: COMMERCIAL

## 2024-11-04 NOTE — TELEPHONE ENCOUNTER
Patient called into the office asking if the symptoms she is experiencing are related to the fact she has started taking a new medication Pitavastatin instead of Livalo 2 mg tablet [[id number]]. Patient states for the last 8 days she has experienced diarrhea, low grade fever. Patient would like to speak with a provider and confirmed number on file is correct.

## 2024-11-04 NOTE — TELEPHONE ENCOUNTER
She called in this morning with some pretty severe side effects from a generic version of refill of Livalo she received 8 days ago (managed by Dr. Carpenter). Patient is experiencing headache, all over body aches, fever of 100.5, diarrhea and muscle cramps.      More likely an infection but can stop the pitavastatin for a week.  Recommend restating once symptoms have cleared for several days.    Reviewed with Dr. Carpenter.  Per Dr. Carpenter - hold 2 weeks before restarting.  Message to Chelsy to check on her next week.

## 2024-12-05 NOTE — TELEPHONE ENCOUNTER
Was able to reach patient today.  She telsl me that she ha been ill with an upper respiratory/sinus infection.  She did go back on the livalo and has been not had any further issues as far as body aches.

## 2024-12-19 DIAGNOSIS — G43.009 MIGRAINE WITHOUT AURA AND WITHOUT STATUS MIGRAINOSUS, NOT INTRACTABLE: ICD-10-CM

## 2024-12-19 RX ORDER — KETOROLAC TROMETHAMINE 10 MG/1
10 TABLET, FILM COATED ORAL EVERY 6 HOURS PRN
Qty: 15 TABLET | Refills: 1 | OUTPATIENT
Start: 2024-12-19 | End: 2024-12-24

## 2025-05-02 ENCOUNTER — APPOINTMENT (OUTPATIENT)
Dept: CARDIOLOGY | Facility: CLINIC | Age: 55
End: 2025-05-02
Payer: COMMERCIAL

## 2025-05-03 DIAGNOSIS — R93.1 ELEVATED CORONARY ARTERY CALCIUM SCORE: ICD-10-CM

## 2025-05-03 DIAGNOSIS — E78.00 HYPERCHOLESTEROLEMIA: ICD-10-CM

## 2025-05-05 DIAGNOSIS — R93.1 ELEVATED CORONARY ARTERY CALCIUM SCORE: ICD-10-CM

## 2025-05-05 DIAGNOSIS — E78.00 HYPERCHOLESTEROLEMIA: ICD-10-CM

## 2025-05-05 RX ORDER — PITAVASTATIN CALCIUM 2.09 MG/1
1 TABLET, FILM COATED ORAL DAILY
Qty: 90 TABLET | Refills: 1 | Status: SHIPPED | OUTPATIENT
Start: 2025-05-05 | End: 2026-05-05

## 2025-05-16 ENCOUNTER — APPOINTMENT (OUTPATIENT)
Dept: CARDIOLOGY | Facility: CLINIC | Age: 55
End: 2025-05-16
Payer: COMMERCIAL

## 2025-05-16 VITALS
BODY MASS INDEX: 29.38 KG/M2 | HEIGHT: 67 IN | DIASTOLIC BLOOD PRESSURE: 78 MMHG | WEIGHT: 187.2 LBS | HEART RATE: 87 BPM | SYSTOLIC BLOOD PRESSURE: 122 MMHG

## 2025-05-16 DIAGNOSIS — E78.00 HYPERCHOLESTEROLEMIA: ICD-10-CM

## 2025-05-16 DIAGNOSIS — R94.31 ABNORMAL ELECTROCARDIOGRAM (ECG) (EKG): Primary | ICD-10-CM

## 2025-05-16 DIAGNOSIS — R93.1 ELEVATED CORONARY ARTERY CALCIUM SCORE: ICD-10-CM

## 2025-05-16 PROCEDURE — 99214 OFFICE O/P EST MOD 30 MIN: CPT | Performed by: INTERNAL MEDICINE

## 2025-05-16 PROCEDURE — 93000 ELECTROCARDIOGRAM COMPLETE: CPT | Performed by: INTERNAL MEDICINE

## 2025-05-16 PROCEDURE — 3008F BODY MASS INDEX DOCD: CPT | Performed by: INTERNAL MEDICINE

## 2025-05-16 PROCEDURE — 1036F TOBACCO NON-USER: CPT | Performed by: INTERNAL MEDICINE

## 2025-05-16 RX ORDER — PITAVASTATIN CALCIUM 2.09 MG/1
1 TABLET, FILM COATED ORAL DAILY
Qty: 90 TABLET | Refills: 3 | Status: SHIPPED | OUTPATIENT
Start: 2025-05-16 | End: 2026-05-16

## 2025-05-16 RX ORDER — PITAVASTATIN CALCIUM 2.09 MG/1
1 TABLET, FILM COATED ORAL DAILY
Refills: 1 | OUTPATIENT
Start: 2025-05-16

## 2025-05-16 RX ORDER — MONTELUKAST SODIUM 10 MG/1
10 TABLET ORAL NIGHTLY
COMMUNITY

## 2025-05-16 RX ORDER — PITAVASTATIN CALCIUM 2.09 MG/1
1 TABLET, FILM COATED ORAL DAILY
Qty: 90 TABLET | Refills: 3 | Status: SHIPPED | OUTPATIENT
Start: 2025-05-16 | End: 2025-05-16 | Stop reason: SDUPTHER

## 2025-05-16 RX ORDER — BUDESONIDE AND FORMOTEROL FUMARATE DIHYDRATE 160; 4.5 UG/1; UG/1
AEROSOL RESPIRATORY (INHALATION)
COMMUNITY
Start: 2025-01-15

## 2025-05-16 RX ORDER — UBIDECARENONE 100 MG
200 CAPSULE ORAL
COMMUNITY

## 2025-05-16 RX ORDER — OMEPRAZOLE 40 MG/1
40 CAPSULE, DELAYED RELEASE ORAL DAILY
COMMUNITY

## 2025-05-16 NOTE — PROGRESS NOTES
Chief Complaint:   No chief complaint on file.     History Of Present Illness:    Nelly Phelps is a 55 y.o. female presenting for yearly follow up.   H/o elevated CAC score, DL, tachycardia post Covid, chest pressure, chronic daily HA / migraine, polyarthritis, Raynaud's phenom malignant melanoma, Covid-19 in 8/2021.    Last seen by me in 5/2024. At the time, Pt was doing well, continued running and was planning to run a 100K in 6/2024. She was avoiding the fasting HR's which bring on neck strain/HA.  Her lipids had improved significantly and she was tolerating livalo 2mg daily without significant myalgias/myopathy.     Today Nelly tells us she is generally doing well from a CV perspective.  She has had a change in her asthma management - she states she has changed her inhalers recently. She mentions her maternal cousin has h/o long QT syndrome. We discussed that her Qtc is mildly elevated (after personal calculation) in office today and are recommending repeat EKG - she wonders if she can have an EKG closer to home. She is training for a 100K run and denies cardiac sxs with this. She did not finish her 100K last year; she lost her best friend and running partner, and father-in-law.     ROS:  The remainder of the review of systems was obtained, as was negative as pertains to the chief complaint.    CV testing and labs reviewed:  EKG today personally reviewed and demonstrated SR, HR 87, QT 432ms, QTc 420ms     5/2024  Lipid chol 191, HDL 86, , LDL 95, trig 49       CT CAC score 10/2021  LM 0,  LAD 13.29,  LCx 0,  RCA 0,   Total 13.29    Stress test 10/2021    Summary:  1. The resting ejection fraction was estimated at 55 to 60% with a peak exercise ejection fraction estimated at 65 to 70%.  2. Negative stress EKG for ischemia.  3. Excellent functional capacity for age.  4. No exercise associated cardiac symptoms were noted.  5. Normal resting and post-rest echocardiographic imaging was seen.  6. Overall  "normal stress echo.    Prior hx:  The patient was seen by Dr. Smith in cardiology due to elevated HR. She had Covid in 8/21, and since infection noticed palpitations. She noticed her HR was going up to 170's, seemed to be higher than usual, not coming down as quickly like it used to. Prior to this, was runing 10-10.5 minute mile, with HR's sitting in the 150-165 range, \"easy run.\" Could run 10 miles and HR's stay 150-165. After a couple miles would creep up to 170's, and would take longer to come down.   In 2020, she did mileage challenges such as 5K every day, or biking/running - felt a little burned out. Took a month off of this kind of training.      After seeing the cardiologist, testing included a stress echo in 10/21 which was negative for ischemia, and a CAC score in 10/21, total score of 13.29, putting her in a low risk group for having CV events. She was recommended a plant-based diet, and was also recommended to start atorvastatin, which she has not taken due to concerns that she already has significant aches and pains from polyarthritis.      She has had a headache for 10 years, ? neck tension. Started on the pill in 5/2021 due to frequent periods, to help regularize her.     Last Recorded Vitals:  There were no vitals filed for this visit.      Past Medical History:  She has a past medical history of Consumes one serving of caffeine per day, COVID-19 (10/18/2021), Headache, unspecified (02/07/2020), Personal history of malignant melanoma of skin, Raynaud's syndrome without gangrene, and Vitamin D deficiency.    Past Surgical History:  She has a past surgical history that includes Ovarian cyst drainage (12/26/2017); Knee surgery (12/26/2017); Skin biopsy (10/18/2021); and Nasal septum surgery (01/03/2018).      Social History:  She reports that she has never smoked. She has never used smokeless tobacco. She reports current alcohol use. She reports that she does not use drugs.    Family History:  Family " History   Problem Relation Name Age of Onset    Thyroid nodules Mother      Other (etoh dependence) Father Jose Armando Zuñiga     Other (suicide) Father Jose Armando Zuñiga     Alcohol abuse Father Jose Armando Zuñiga     Hypertension Other grandparent     Coronary artery disease Other grandmother     Diabetes Other grandmother     Heart disease Paternal Grandmother Jovita Gómez         Allergies:  Amoxicillin    Outpatient Medications:  Current Outpatient Medications   Medication Instructions    albuterol 90 mcg/actuation inhaler     cholecalciferol (Vitamin D-3) 5,000 Units tablet oral    cyclobenzaprine (FLEXERIL) 10 mg, oral, Nightly    ferrous sulfate (IRON ORAL) oral    ibuprofen 600 mg, oral, Every 6 hours PRN    Livalo 2 mg tablet 1 tablet, oral, Daily    MAGNESIUM GLYCINATE ORAL 2 capsules, oral, Daily    omega-3 (Fish OiL) 60- mg capsule oral    Pulmicort Flexhaler 180 mcg/actuation inhaler 1 puff, inhalation, 2 times daily RT    triamcinolone (Nasacort) 55 mcg nasal inhaler 2 sprays, Each Nostril, Daily       Physical Exam:  Physical Exam  HENT:      Head: Normocephalic.      Mouth/Throat:      Mouth: Mucous membranes are moist.   Cardiovascular:      Rate and Rhythm: Normal rate and regular rhythm.      Comments: 1/6 systolic murmur  LLSB  No carotid bruits   No leg swelling   Pulmonary:      Effort: Pulmonary effort is normal.      Breath sounds: Normal breath sounds.   Abdominal:      Palpations: Abdomen is soft.   Musculoskeletal:         General: Normal range of motion.      Cervical back: Normal range of motion.   Skin:     General: Skin is warm and dry.   Neurological:      General: No focal deficit present.      Mental Status: She is alert.   Psychiatric:         Mood and Affect: Mood normal.           Last Labs:  CBC -  Lab Results   Component Value Date    WBC 5.6 08/09/2023    HGB 14.1 08/09/2023    HCT 43.3 08/09/2023    MCV 98 08/09/2023     08/09/2023       CMP -  Lab Results   Component  Value Date    CALCIUM 10.3 08/09/2023    PROT 7.4 08/09/2023    ALBUMIN 4.6 08/09/2023    AST 19 08/09/2023    ALT 14 08/09/2023    ALKPHOS 77 08/09/2023    BILITOT 0.7 08/09/2023       LIPID PANEL -   Lab Results   Component Value Date    CHOL 225 (H) 08/10/2022    TRIG 160 (H) 08/10/2022    HDL 71.4 08/10/2022    CHHDL 3.2 08/10/2022    LDLF 122 (H) 08/10/2022    VLDL 32 08/10/2022       RENAL FUNCTION PANEL -   Lab Results   Component Value Date    GLUCOSE 83 08/09/2023     08/09/2023    K 4.6 08/09/2023     08/09/2023    CO2 32 08/09/2023    ANIONGAP 12 08/09/2023    BUN 19 08/09/2023    CREATININE 0.77 08/09/2023    CALCIUM 10.3 08/09/2023    ALBUMIN 4.6 08/09/2023        Lab Results   Component Value Date    HGBA1C 5.4 09/09/2022     Assessment/Plan   Prolonged QTc:  EKG today personally reviewed and demonstrated SR, HR 87, QT 432ms, QTc 420ms - slightly longer than previous EKG  Denies changes to her medications aside from her inhalers.  -instructed to rpt EKG in 2 weeks (Pt states she will be away)  -consider FU with Pulm to change medications    Elevated CAC score - total score 13.29.  No current chest pain.  Normal stress echo in 10/21.    -continue livalo 2mg daily  -healthy cardiac lifestyle modifications    DL:  seems to be tolerating livalo 2mg daily without significant myalgias, CPK was 112  -continue livalo 2mg daily    Tachycardia:  develops fast HR's in the 160's and above when sprinting, however has been doing distance running/training lately, and is avoiding the fasting HR's which bring on neck strain/HA.    -recommended that she continue to try to obtain her BP as needed when she has the above sx, she will complete her upcoming 100k.    Scribe Attestation  By signing my name below, ICasandra, Scribe   attest that this documentation has been prepared under the direction and in the presence of Sam Carpenter MD.

## 2025-05-16 NOTE — PATIENT INSTRUCTIONS
Thanks for following up in office today.    1)  Please repeat your EKG in 2 weeks either at our office if you can make it or at your PCP's office. We will call you in 2 weeks to follow up on this. I also ordered blood work for you to get done for this to check your magnesium and potassium levels.    2)  Please get your blood work done that was ordered by your PCP. I am interested in your cholesterol levels. Please call when you get this done for us to review. You can have your results faxed to us at [phone].    3)  Please continue your cardiac medications as prescribed.    Follow up with Dr. Carpenter in 6 months  If you have any questions, please call us at [phone]

## 2025-05-29 ENCOUNTER — TELEPHONE (OUTPATIENT)
Dept: CARDIOLOGY | Facility: HOSPITAL | Age: 55
End: 2025-05-29
Payer: COMMERCIAL

## 2025-05-29 NOTE — TELEPHONE ENCOUNTER
Attempted to call patient to inquire two week repeat EKG. LVM. Will reach out again today.   Joana Mejias MA

## 2025-05-29 NOTE — TELEPHONE ENCOUNTER
PT returned Joana MICHAEL call regarding 2 week EKG and states she will be getting EKG tomorrow 5/30 and will have it sent to our office. Pt also states she will be updating labs tomorrow.

## 2025-06-06 NOTE — TELEPHONE ENCOUNTER
Spoke with patient and she stated she did have her repeat ECG done, and will attempt to upload it to her MyChart until her PCP faxes it to our office next week.   Joana Mejias MA

## 2025-06-12 ENCOUNTER — TELEPHONE (OUTPATIENT)
Dept: CARDIOLOGY | Facility: HOSPITAL | Age: 55
End: 2025-06-12
Payer: COMMERCIAL

## 2025-06-12 NOTE — TELEPHONE ENCOUNTER
RN called pt at this time regarding any symptoms, no answer at this time. RN left message for patient to call back.

## 2025-06-13 ENCOUNTER — TELEPHONE (OUTPATIENT)
Dept: CARDIOLOGY | Facility: HOSPITAL | Age: 55
End: 2025-06-13
Payer: COMMERCIAL

## 2025-06-13 NOTE — TELEPHONE ENCOUNTER
RN called pt at this time regarding fatigue when running. RN spoke with Dr. Carpenter at this time and is willing to place orders for testing. No answer at this time. RN left message and will call patient back.

## 2025-06-14 ENCOUNTER — TELEPHONE (OUTPATIENT)
Dept: CARDIOLOGY | Facility: HOSPITAL | Age: 55
End: 2025-06-14
Payer: COMMERCIAL

## 2025-06-14 DIAGNOSIS — R94.31 ABNORMAL ELECTROCARDIOGRAM (ECG) (EKG): Primary | ICD-10-CM

## 2025-06-14 DIAGNOSIS — I25.10 CORONARY ARTERY DISEASE INVOLVING NATIVE CORONARY ARTERY OF NATIVE HEART WITHOUT ANGINA PECTORIS: ICD-10-CM

## 2025-06-14 NOTE — TELEPHONE ENCOUNTER
RN called pt at this time regarding her fatigue when running. RN notified Dr. Carpenter and she advised for echo and stress echo. Pt was agreeable. RN placed orders at this time and gave pt the number for central scheduling to call Monday. Pt verbalized understanding.

## 2025-06-14 NOTE — TELEPHONE ENCOUNTER
RN called pt at this time regarding EKG concerns, no answer at this time. RN left message for patient to call back.

## 2025-06-26 ENCOUNTER — APPOINTMENT (OUTPATIENT)
Facility: CLINIC | Age: 55
End: 2025-06-26
Payer: COMMERCIAL

## 2025-07-03 ENCOUNTER — HOSPITAL ENCOUNTER (OUTPATIENT)
Dept: CARDIOLOGY | Facility: HOSPITAL | Age: 55
Discharge: HOME | End: 2025-07-03
Payer: COMMERCIAL

## 2025-07-03 DIAGNOSIS — I25.10 CORONARY ARTERY DISEASE INVOLVING NATIVE CORONARY ARTERY OF NATIVE HEART WITHOUT ANGINA PECTORIS: ICD-10-CM

## 2025-07-03 DIAGNOSIS — R94.31 ABNORMAL ELECTROCARDIOGRAM (ECG) (EKG): ICD-10-CM

## 2025-07-03 LAB
AORTIC VALVE MEAN GRADIENT: 3 MMHG
AORTIC VALVE PEAK VELOCITY: 1.26 M/S
AV PEAK GRADIENT: 6 MMHG
AVA (PEAK VEL): 3.2 CM2
AVA (VTI): 3.5 CM2
EJECTION FRACTION APICAL 4 CHAMBER: 63.9
EJECTION FRACTION: 65 %
LEFT ATRIUM VOLUME AREA LENGTH INDEX BSA: 25.6 ML/M2
LEFT VENTRICLE INTERNAL DIMENSION DIASTOLE: 4.27 CM (ref 3.5–6)
LEFT VENTRICULAR OUTFLOW TRACT DIAMETER: 2.02 CM
LV EJECTION FRACTION BIPLANE: 65 %
MITRAL VALVE E/A RATIO: 1.38
RIGHT VENTRICLE FREE WALL PEAK S': 10.83 CM/S
TRICUSPID ANNULAR PLANE SYSTOLIC EXCURSION: 2.4 CM

## 2025-07-03 PROCEDURE — 93306 TTE W/DOPPLER COMPLETE: CPT | Performed by: INTERNAL MEDICINE

## 2025-07-03 PROCEDURE — C8928 TTE W OR W/O FOL W/CON,STRES: HCPCS

## 2025-07-03 PROCEDURE — 93306 TTE W/DOPPLER COMPLETE: CPT

## 2025-08-05 ENCOUNTER — RESULTS FOLLOW-UP (OUTPATIENT)
Dept: CARDIOLOGY | Facility: HOSPITAL | Age: 55
End: 2025-08-05
Payer: COMMERCIAL

## 2025-08-07 NOTE — TELEPHONE ENCOUNTER
Rn called pt at this time regarding results. No answer at this time. RN left message for patient to call back.        ----- Message from Sam Carpenter sent at 8/5/2025  6:49 PM EDT -----  Ordered for abnl EKG.  Normal stress echo - negative for ischemia.  Will task Carol Rosario to contact pt with result and have her follow up as scheduled.    ----- Message -----  From: Cristel, Syngo - Cardiology Results In  Sent: 7/3/2025  11:35 AM EDT  To: Sam Carpenter MD

## 2025-08-08 NOTE — TELEPHONE ENCOUNTER
RN called pt at this time with results and plan. Pt verbalized understanding.      ----- Message from Sam Carpenter sent at 8/5/2025  6:49 PM EDT -----  Ordered for abnl EKG.  Normal stress echo - negative for ischemia.  Will task Carol Rosario to contact pt with result and have her follow up as scheduled.    ----- Message -----  From: Interface, Syngo - Cardiology Results In  Sent: 7/3/2025  11:35 AM EDT  To: Sam Carpenter MD

## 2025-08-08 NOTE — TELEPHONE ENCOUNTER
RN called pt at this time regarding results and plan. No answer at this time. RN left message for patient to call back.         ----- Message from Sam Carpenter sent at 8/5/2025  6:49 PM EDT -----  Ordered for abnl EKG.  Normal stress echo - negative for ischemia.  Will task Carol Rosario to contact pt with result and have her follow up as scheduled.    ----- Message -----  From: Cristel, Syngo - Cardiology Results In  Sent: 7/3/2025  11:35 AM EDT  To: Sam Carpenter MD

## 2025-08-11 ENCOUNTER — APPOINTMENT (OUTPATIENT)
Dept: RHEUMATOLOGY | Facility: CLINIC | Age: 55
End: 2025-08-11
Payer: COMMERCIAL

## 2025-08-11 VITALS
HEIGHT: 67 IN | WEIGHT: 187.1 LBS | DIASTOLIC BLOOD PRESSURE: 71 MMHG | HEART RATE: 80 BPM | TEMPERATURE: 97.8 F | BODY MASS INDEX: 29.37 KG/M2 | SYSTOLIC BLOOD PRESSURE: 112 MMHG | OXYGEN SATURATION: 99 %

## 2025-08-11 DIAGNOSIS — Z79.1 NSAID LONG-TERM USE: ICD-10-CM

## 2025-08-11 DIAGNOSIS — M06.4 POLYARTHRITIS, INFLAMMATORY (MULTI): Primary | ICD-10-CM

## 2025-08-11 PROBLEM — G89.29: Status: RESOLVED | Noted: 2023-11-04 | Resolved: 2025-08-11

## 2025-08-11 PROBLEM — I25.10 CAD (CORONARY ARTERY DISEASE): Status: ACTIVE | Noted: 2023-11-04

## 2025-08-11 PROBLEM — R06.02 SOB (SHORTNESS OF BREATH) ON EXERTION: Status: RESOLVED | Noted: 2023-11-04 | Resolved: 2025-08-11

## 2025-08-11 PROBLEM — M70.60 GREATER TROCHANTERIC BURSITIS: Status: RESOLVED | Noted: 2023-11-04 | Resolved: 2025-08-11

## 2025-08-11 PROCEDURE — 1036F TOBACCO NON-USER: CPT | Performed by: INTERNAL MEDICINE

## 2025-08-11 PROCEDURE — 99214 OFFICE O/P EST MOD 30 MIN: CPT | Performed by: INTERNAL MEDICINE

## 2025-08-11 PROCEDURE — 3008F BODY MASS INDEX DOCD: CPT | Performed by: INTERNAL MEDICINE

## 2025-08-11 RX ORDER — CYCLOBENZAPRINE HCL 10 MG
10 TABLET ORAL NIGHTLY
Qty: 90 TABLET | Refills: 3 | Status: SHIPPED | OUTPATIENT
Start: 2025-08-11 | End: 2026-08-11

## 2025-08-11 RX ORDER — IBUPROFEN 600 MG/1
600 TABLET, FILM COATED ORAL EVERY 6 HOURS PRN
Qty: 360 TABLET | Refills: 3 | Status: SHIPPED | OUTPATIENT
Start: 2025-08-11 | End: 2026-08-11

## 2025-08-11 RX ORDER — FAMOTIDINE 40 MG/1
40 TABLET, FILM COATED ORAL NIGHTLY
COMMUNITY
Start: 2025-07-07

## 2025-08-11 ASSESSMENT — ENCOUNTER SYMPTOMS
SHORTNESS OF BREATH: 0
COLOR CHANGE: 0
MYALGIAS: 0
FEVER: 0
ARTHRALGIAS: 1
WEAKNESS: 0
FATIGUE: 1
BACK PAIN: 0
NUMBNESS: 0
HEADACHES: 1
EYES NEGATIVE: 1
GASTROINTESTINAL NEGATIVE: 1
ENDOCRINE NEGATIVE: 1
JOINT SWELLING: 0
PSYCHIATRIC NEGATIVE: 1
COUGH: 0

## 2025-08-11 ASSESSMENT — PATIENT HEALTH QUESTIONNAIRE - PHQ9
SUM OF ALL RESPONSES TO PHQ9 QUESTIONS 1 AND 2: 0
1. LITTLE INTEREST OR PLEASURE IN DOING THINGS: NOT AT ALL
2. FEELING DOWN, DEPRESSED OR HOPELESS: NOT AT ALL

## 2025-08-12 LAB
ALBUMIN SERPL-MCNC: 4.5 G/DL (ref 3.6–5.1)
ALP SERPL-CCNC: 79 U/L (ref 37–153)
ALT SERPL-CCNC: 16 U/L (ref 6–29)
ANION GAP SERPL CALCULATED.4IONS-SCNC: 12 MMOL/L (CALC) (ref 7–17)
AST SERPL-CCNC: 16 U/L (ref 10–35)
BASOPHILS # BLD AUTO: 39 CELLS/UL (ref 0–200)
BASOPHILS NFR BLD AUTO: 0.7 %
BILIRUB SERPL-MCNC: 0.3 MG/DL (ref 0.2–1.2)
BUN SERPL-MCNC: 15 MG/DL (ref 7–25)
CALCIUM SERPL-MCNC: 9.5 MG/DL (ref 8.6–10.4)
CHLORIDE SERPL-SCNC: 102 MMOL/L (ref 98–110)
CO2 SERPL-SCNC: 27 MMOL/L (ref 20–32)
CREAT SERPL-MCNC: 0.66 MG/DL (ref 0.5–1.03)
CRP SERPL-MCNC: <3 MG/L
EGFRCR SERPLBLD CKD-EPI 2021: 104 ML/MIN/1.73M2
EOSINOPHIL # BLD AUTO: 182 CELLS/UL (ref 15–500)
EOSINOPHIL NFR BLD AUTO: 3.3 %
ERYTHROCYTE [DISTWIDTH] IN BLOOD BY AUTOMATED COUNT: 12.8 % (ref 11–15)
ERYTHROCYTE [SEDIMENTATION RATE] IN BLOOD BY WESTERGREN METHOD: 6 MM/H
GLUCOSE SERPL-MCNC: 106 MG/DL (ref 65–99)
HCT VFR BLD AUTO: 42.4 % (ref 35–45)
HGB BLD-MCNC: 13.8 G/DL (ref 11.7–15.5)
LYMPHOCYTES # BLD AUTO: 1914 CELLS/UL (ref 850–3900)
LYMPHOCYTES NFR BLD AUTO: 34.8 %
MCH RBC QN AUTO: 31.8 PG (ref 27–33)
MCHC RBC AUTO-ENTMCNC: 32.5 G/DL (ref 32–36)
MCV RBC AUTO: 97.7 FL (ref 80–100)
MONOCYTES # BLD AUTO: 429 CELLS/UL (ref 200–950)
MONOCYTES NFR BLD AUTO: 7.8 %
NEUTROPHILS # BLD AUTO: 2937 CELLS/UL (ref 1500–7800)
NEUTROPHILS NFR BLD AUTO: 53.4 %
PLATELET # BLD AUTO: 331 THOUSAND/UL (ref 140–400)
PMV BLD REES-ECKER: 9.9 FL (ref 7.5–12.5)
POTASSIUM SERPL-SCNC: 4.1 MMOL/L (ref 3.5–5.3)
PROT SERPL-MCNC: 7 G/DL (ref 6.1–8.1)
RBC # BLD AUTO: 4.34 MILLION/UL (ref 3.8–5.1)
SODIUM SERPL-SCNC: 141 MMOL/L (ref 135–146)
WBC # BLD AUTO: 5.5 THOUSAND/UL (ref 3.8–10.8)

## 2026-05-15 ENCOUNTER — APPOINTMENT (OUTPATIENT)
Dept: CARDIOLOGY | Facility: CLINIC | Age: 56
End: 2026-05-15
Payer: COMMERCIAL

## 2026-08-12 ENCOUNTER — APPOINTMENT (OUTPATIENT)
Dept: RHEUMATOLOGY | Facility: CLINIC | Age: 56
End: 2026-08-12
Payer: COMMERCIAL